# Patient Record
Sex: FEMALE | Race: WHITE | Employment: FULL TIME | URBAN - METROPOLITAN AREA
[De-identification: names, ages, dates, MRNs, and addresses within clinical notes are randomized per-mention and may not be internally consistent; named-entity substitution may affect disease eponyms.]

---

## 2017-05-01 PROCEDURE — 88175 CYTOPATH C/V AUTO FLUID REDO: CPT | Performed by: OBSTETRICS & GYNECOLOGY

## 2017-05-01 PROCEDURE — 87491 CHLMYD TRACH DNA AMP PROBE: CPT | Performed by: OBSTETRICS & GYNECOLOGY

## 2017-05-01 PROCEDURE — 87591 N.GONORRHOEAE DNA AMP PROB: CPT | Performed by: OBSTETRICS & GYNECOLOGY

## 2017-05-01 PROCEDURE — 87624 HPV HI-RISK TYP POOLED RSLT: CPT | Performed by: OBSTETRICS & GYNECOLOGY

## 2021-10-04 ENCOUNTER — OFFICE VISIT (OUTPATIENT)
Dept: OBGYN CLINIC | Facility: CLINIC | Age: 36
End: 2021-10-04
Payer: COMMERCIAL

## 2021-10-04 VITALS
BODY MASS INDEX: 32.14 KG/M2 | WEIGHT: 174.63 LBS | HEIGHT: 62 IN | SYSTOLIC BLOOD PRESSURE: 124 MMHG | DIASTOLIC BLOOD PRESSURE: 82 MMHG | HEART RATE: 77 BPM

## 2021-10-04 DIAGNOSIS — Z12.4 ENCOUNTER FOR SCREENING FOR CERVICAL CANCER: ICD-10-CM

## 2021-10-04 DIAGNOSIS — Z01.419 WELL WOMAN EXAM WITH ROUTINE GYNECOLOGICAL EXAM: Primary | ICD-10-CM

## 2021-10-04 PROCEDURE — 3079F DIAST BP 80-89 MM HG: CPT | Performed by: OBSTETRICS & GYNECOLOGY

## 2021-10-04 PROCEDURE — 3008F BODY MASS INDEX DOCD: CPT | Performed by: OBSTETRICS & GYNECOLOGY

## 2021-10-04 PROCEDURE — 3074F SYST BP LT 130 MM HG: CPT | Performed by: OBSTETRICS & GYNECOLOGY

## 2021-10-04 PROCEDURE — 99385 PREV VISIT NEW AGE 18-39: CPT | Performed by: OBSTETRICS & GYNECOLOGY

## 2021-10-04 RX ORDER — VALACYCLOVIR HYDROCHLORIDE 500 MG/1
500 TABLET, FILM COATED ORAL DAILY
COMMUNITY
Start: 2021-08-27

## 2021-10-04 NOTE — PROGRESS NOTES
[facility]  Obstetrics and Gynecology  History & Physical    CC: Patient is a new patient and here for a well woman exam     Subjective:     HPI: Belinda Chiu is a 31year old  female here for a well women exam. Patient reports doing w level: Not on file    Occupational History      Not on file    Tobacco Use      Smoking status: Former Smoker        Packs/day: 0.50        Years: 10.00        Pack years: 5        Types: Cigarettes      Smokeless tobacco: Never Used    Vaping Use      Vap Current or Ex-Partner: Not on file      Emotionally Abused: Not on file      Physically Abused: Not on file      Sexually Abused: Not on file  Housing Stability:       Unable to Pay for Housing in the Last Year: Not on file      Number of Places Lived in t masses  Ext: non-tender, no edema    Assessment:     Edel Manriquez is a 31year old  female here for a well women exam.     Patient Active Problem List:     Family history of breast cancer        Plan:     Cervical cancer screening  - discussion

## 2021-11-11 ENCOUNTER — WALK IN (OUTPATIENT)
Dept: URGENT CARE | Age: 36
End: 2021-11-11

## 2021-11-11 VITALS
HEART RATE: 74 BPM | TEMPERATURE: 98.9 F | WEIGHT: 176 LBS | OXYGEN SATURATION: 99 % | DIASTOLIC BLOOD PRESSURE: 88 MMHG | RESPIRATION RATE: 18 BRPM | SYSTOLIC BLOOD PRESSURE: 130 MMHG

## 2021-11-11 DIAGNOSIS — J02.9 SORETHROAT: ICD-10-CM

## 2021-11-11 DIAGNOSIS — Z20.822 SUSPECTED COVID-19 VIRUS INFECTION: ICD-10-CM

## 2021-11-11 DIAGNOSIS — J01.90 ACUTE NON-RECURRENT SINUSITIS, UNSPECIFIED LOCATION: Primary | ICD-10-CM

## 2021-11-11 LAB
INTERNAL PROCEDURAL CONTROLS ACCEPTABLE: YES
S PYO AG THROAT QL IA.RAPID: NEGATIVE
SARS-COV+SARS-COV-2 AG RESP QL IA.RAPID: NOT DETECTED

## 2021-11-11 PROCEDURE — 99212 OFFICE O/P EST SF 10 MIN: CPT | Performed by: EMERGENCY MEDICINE

## 2021-11-11 PROCEDURE — 87426 SARSCOV CORONAVIRUS AG IA: CPT | Performed by: EMERGENCY MEDICINE

## 2021-11-11 PROCEDURE — 87880 STREP A ASSAY W/OPTIC: CPT | Performed by: EMERGENCY MEDICINE

## 2021-11-11 RX ORDER — AMOXICILLIN AND CLAVULANATE POTASSIUM 875; 125 MG/1; MG/1
1 TABLET, FILM COATED ORAL 2 TIMES DAILY
Qty: 14 TABLET | Refills: 0 | Status: SHIPPED | OUTPATIENT
Start: 2021-11-11 | End: 2021-11-18

## 2021-11-11 ASSESSMENT — PAIN SCALES - GENERAL: PAINLEVEL: 2

## 2021-12-14 ENCOUNTER — HOSPITAL ENCOUNTER (OUTPATIENT)
Age: 36
Discharge: HOME | End: 2021-12-14
Payer: COMMERCIAL

## 2021-12-14 VITALS
OXYGEN SATURATION: 100 % | HEIGHT: 65 IN | WEIGHT: 170 LBS | DIASTOLIC BLOOD PRESSURE: 84 MMHG | SYSTOLIC BLOOD PRESSURE: 123 MMHG | BODY MASS INDEX: 28.32 KG/M2 | RESPIRATION RATE: 18 BRPM | HEART RATE: 84 BPM | TEMPERATURE: 99 F

## 2021-12-14 DIAGNOSIS — J01.90 ACUTE SINUSITIS, RECURRENCE NOT SPECIFIED, UNSPECIFIED LOCATION: ICD-10-CM

## 2021-12-14 DIAGNOSIS — Z20.822 LAB TEST NEGATIVE FOR COVID-19 VIRUS: ICD-10-CM

## 2021-12-14 DIAGNOSIS — Z20.822 EXPOSURE TO 2019 NOVEL CORONAVIRUS: ICD-10-CM

## 2021-12-14 DIAGNOSIS — J02.9 PHARYNGITIS, UNSPECIFIED ETIOLOGY: Primary | ICD-10-CM

## 2021-12-14 PROCEDURE — U0002 COVID-19 LAB TEST NON-CDC: HCPCS | Performed by: NURSE PRACTITIONER

## 2021-12-14 PROCEDURE — 99203 OFFICE O/P NEW LOW 30 MIN: CPT | Performed by: NURSE PRACTITIONER

## 2021-12-14 PROCEDURE — 87880 STREP A ASSAY W/OPTIC: CPT | Performed by: NURSE PRACTITIONER

## 2021-12-14 RX ORDER — DEXAMETHASONE SODIUM PHOSPHATE 4 MG/ML
10 INJECTION, SOLUTION INTRA-ARTICULAR; INTRALESIONAL; INTRAMUSCULAR; INTRAVENOUS; SOFT TISSUE ONCE
Status: DISCONTINUED | OUTPATIENT
Start: 2021-12-14 | End: 2021-12-14

## 2021-12-14 RX ORDER — AMOXICILLIN AND CLAVULANATE POTASSIUM 875; 125 MG/1; MG/1
1 TABLET, FILM COATED ORAL 2 TIMES DAILY
Qty: 10 TABLET | Refills: 0 | Status: SHIPPED | OUTPATIENT
Start: 2021-12-14 | End: 2021-12-19

## 2021-12-14 RX ORDER — DEXAMETHASONE 4 MG/1
10 TABLET ORAL ONCE
Status: COMPLETED | OUTPATIENT
Start: 2021-12-14 | End: 2021-12-14

## 2021-12-14 RX ORDER — FLUTICASONE PROPIONATE 50 MCG
2 SPRAY, SUSPENSION (ML) NASAL DAILY
Qty: 16 G | Refills: 0 | Status: SHIPPED | OUTPATIENT
Start: 2021-12-14 | End: 2022-01-13

## 2021-12-14 RX ORDER — LIDOCAINE HYDROCHLORIDE 20 MG/ML
5 SOLUTION OROPHARYNGEAL
Qty: 100 ML | Refills: 0 | Status: SHIPPED | OUTPATIENT
Start: 2021-12-14

## 2021-12-14 NOTE — ED PROVIDER NOTES
Patient Seen in: [facility] [address] [place] [address]      History   Patient presents with:  Sore Throat    Stated Complaint: sore throat    Subjective:   HPI  Patient is a 65-year-old female without significant medical history presents with 2-day history of 165.1 cm (5' 5\")   Wt 77.1 kg   LMP 12/13/2021   SpO2 100%   BMI 28.29 kg/m²         Physical Exam  Vitals and nursing note reviewed. Constitutional:       General: She is not in acute distress. Appearance: Normal appearance. She is well-developed. and Affect: Mood normal.         Behavior: Behavior normal.                 ED Course     Labs Reviewed   POCT RAPID STREP - Normal   RAPID SARS-COV-2 BY PCR - Normal   GRP A STREP CULT, THROAT                     MDM     Rapid strep negative.  Culture pend

## 2022-05-13 ENCOUNTER — HOSPITAL ENCOUNTER (OUTPATIENT)
Age: 37
Discharge: HOME | End: 2022-05-13
Payer: COMMERCIAL

## 2022-05-13 VITALS
HEIGHT: 65 IN | OXYGEN SATURATION: 100 % | DIASTOLIC BLOOD PRESSURE: 81 MMHG | HEART RATE: 85 BPM | WEIGHT: 170 LBS | RESPIRATION RATE: 16 BRPM | TEMPERATURE: 98 F | BODY MASS INDEX: 28.32 KG/M2 | SYSTOLIC BLOOD PRESSURE: 128 MMHG

## 2022-05-13 DIAGNOSIS — J01.00 ACUTE NON-RECURRENT MAXILLARY SINUSITIS: ICD-10-CM

## 2022-05-13 DIAGNOSIS — Z11.52 ENCOUNTER FOR SCREENING FOR COVID-19: Primary | ICD-10-CM

## 2022-05-13 DIAGNOSIS — H61.21 IMPACTED CERUMEN OF RIGHT EAR: ICD-10-CM

## 2022-05-13 LAB — SARS-COV-2 RNA RESP QL NAA+PROBE: NOT DETECTED

## 2022-05-13 PROCEDURE — 69210 REMOVE IMPACTED EAR WAX UNI: CPT | Performed by: PHYSICIAN ASSISTANT

## 2022-05-13 PROCEDURE — 99213 OFFICE O/P EST LOW 20 MIN: CPT | Performed by: PHYSICIAN ASSISTANT

## 2022-05-13 PROCEDURE — U0002 COVID-19 LAB TEST NON-CDC: HCPCS | Performed by: PHYSICIAN ASSISTANT

## 2022-05-13 RX ORDER — AMOXICILLIN AND CLAVULANATE POTASSIUM 875; 125 MG/1; MG/1
1 TABLET, FILM COATED ORAL 2 TIMES DAILY
Qty: 10 TABLET | Refills: 0 | Status: SHIPPED | OUTPATIENT
Start: 2022-05-13 | End: 2022-05-18

## 2022-05-13 NOTE — ED INITIAL ASSESSMENT (HPI)
Patient reports sinus pressure/congestion, ear pain, sore throat, feeling of swollen neck/glands x 5 days. States she has 4 infections since November.

## 2022-06-14 ENCOUNTER — PATIENT MESSAGE (OUTPATIENT)
Dept: OBGYN CLINIC | Facility: CLINIC | Age: 37
End: 2022-06-14

## 2022-06-16 ENCOUNTER — TELEPHONE (OUTPATIENT)
Dept: OBGYN CLINIC | Facility: CLINIC | Age: 37
End: 2022-06-16

## 2022-06-16 NOTE — TELEPHONE ENCOUNTER
30year old patient complaining of cramping and bloating that started the day after she finished her period. No s/s of vaginal infection. Pain was stabbing in nature on Tuesday; felt like horrible menstrual cramping. Today is a dull ache. Pain is currently 1/10 but it was 9/10 on Tuesday. Last BM was this morning; denies constipation. Some diarrhea/loose stools Wednesday morning. Denies any body aches, chills, fever. LMP: 6/8/22, gets regular menses, has not experienced something like this previously. Last OV date: 10/4/21 with Dr. Kristen Santiago for annual  Recent Test/Labs: none   Recommendations: assisted with scheduling appt.

## 2022-06-16 NOTE — TELEPHONE ENCOUNTER
Pt calling regarding her [employer] message. Says she is having severe bloating and cramping. Finished cycle on Sunday and these symptoms started Monday.

## 2022-06-16 NOTE — TELEPHONE ENCOUNTER
From: Elena Navarrete  To: Justice Cunningham MD  Sent: 6/14/2022 8:12 AM CDT  Subject: Cramping after period     Hi, my period just ended on Sunday and I am having intense cramps, almost worse than regular period cramps. Should I try to get an appointment asap?

## 2022-06-17 ENCOUNTER — OFFICE VISIT (OUTPATIENT)
Dept: OBGYN CLINIC | Facility: CLINIC | Age: 37
End: 2022-06-17
Payer: COMMERCIAL

## 2022-06-17 VITALS
WEIGHT: 180.5 LBS | SYSTOLIC BLOOD PRESSURE: 126 MMHG | BODY MASS INDEX: 33.21 KG/M2 | HEART RATE: 83 BPM | HEIGHT: 62 IN | DIASTOLIC BLOOD PRESSURE: 74 MMHG

## 2022-06-17 DIAGNOSIS — R14.0 ABDOMINAL BLOATING: ICD-10-CM

## 2022-06-17 DIAGNOSIS — R10.30 LOWER ABDOMINAL PAIN: Primary | ICD-10-CM

## 2022-06-17 PROCEDURE — 99214 OFFICE O/P EST MOD 30 MIN: CPT | Performed by: OBSTETRICS & GYNECOLOGY

## 2022-06-17 PROCEDURE — 3078F DIAST BP <80 MM HG: CPT | Performed by: OBSTETRICS & GYNECOLOGY

## 2022-06-17 PROCEDURE — 3074F SYST BP LT 130 MM HG: CPT | Performed by: OBSTETRICS & GYNECOLOGY

## 2022-06-17 PROCEDURE — 3008F BODY MASS INDEX DOCD: CPT | Performed by: OBSTETRICS & GYNECOLOGY

## 2022-06-17 RX ORDER — FLUTICASONE PROPIONATE 50 MCG
2 SPRAY, SUSPENSION (ML) NASAL DAILY
COMMUNITY
Start: 2022-06-13 | End: 2022-07-13

## 2022-06-17 RX ORDER — TRIAMCINOLONE ACETONIDE 0.25 MG/G
OINTMENT TOPICAL
COMMUNITY
Start: 2022-05-03

## 2022-06-17 RX ORDER — TACROLIMUS 1 MG/G
OINTMENT TOPICAL
COMMUNITY
Start: 2022-05-04

## 2022-06-18 LAB
CANDIDA:: NOT DETECTED
CHLAMYDIA TRACHOMATIS$RNA, TMA: NOT DETECTED
GARDNERELLA:: NOT DETECTED
NEISSERIA GONORRHOEAE$RNA, TMA: NOT DETECTED
TRICHOMONAS:: NOT DETECTED

## 2022-07-07 ENCOUNTER — ULTRASOUND ENCOUNTER (OUTPATIENT)
Dept: OBGYN CLINIC | Facility: CLINIC | Age: 37
End: 2022-07-07
Payer: COMMERCIAL

## 2022-07-07 DIAGNOSIS — R10.30 LOWER ABDOMINAL PAIN: Primary | ICD-10-CM

## 2022-07-07 PROCEDURE — 76830 TRANSVAGINAL US NON-OB: CPT | Performed by: OBSTETRICS & GYNECOLOGY

## 2022-07-07 PROCEDURE — 76856 US EXAM PELVIC COMPLETE: CPT | Performed by: OBSTETRICS & GYNECOLOGY

## 2023-11-14 ENCOUNTER — HOSPITAL ENCOUNTER (OUTPATIENT)
Age: 38
Discharge: HOME | End: 2023-11-14
Payer: COMMERCIAL

## 2023-11-14 VITALS
HEIGHT: 65 IN | WEIGHT: 190 LBS | BODY MASS INDEX: 31.65 KG/M2 | DIASTOLIC BLOOD PRESSURE: 86 MMHG | SYSTOLIC BLOOD PRESSURE: 143 MMHG | OXYGEN SATURATION: 99 % | HEART RATE: 68 BPM | TEMPERATURE: 98 F | RESPIRATION RATE: 22 BRPM

## 2023-11-14 DIAGNOSIS — H65.01 RIGHT ACUTE SEROUS OTITIS MEDIA, RECURRENCE NOT SPECIFIED: ICD-10-CM

## 2023-11-14 DIAGNOSIS — J01.10 ACUTE FRONTAL SINUSITIS, RECURRENCE NOT SPECIFIED: Primary | ICD-10-CM

## 2023-11-14 PROCEDURE — 99213 OFFICE O/P EST LOW 20 MIN: CPT | Performed by: PHYSICIAN ASSISTANT

## 2023-11-14 RX ORDER — OXYMETAZOLINE HYDROCHLORIDE 0.05 G/100ML
1 SPRAY NASAL EVERY 4 HOURS PRN
Qty: 15 ML | Refills: 0 | Status: SHIPPED | OUTPATIENT
Start: 2023-11-14 | End: 2023-12-14

## 2023-11-14 RX ORDER — BENZONATATE 100 MG/1
100 CAPSULE ORAL 3 TIMES DAILY PRN
Qty: 30 CAPSULE | Refills: 0 | Status: SHIPPED | OUTPATIENT
Start: 2023-11-14 | End: 2023-12-14

## 2023-11-14 RX ORDER — CETIRIZINE HYDROCHLORIDE 10 MG/1
10 TABLET ORAL DAILY
Qty: 30 TABLET | Refills: 0 | Status: SHIPPED | OUTPATIENT
Start: 2023-11-14 | End: 2023-12-14

## 2023-11-14 RX ORDER — PSEUDOEPHEDRINE HCL 30 MG
30 TABLET ORAL 3 TIMES DAILY
Qty: 15 TABLET | Refills: 0 | Status: SHIPPED | OUTPATIENT
Start: 2023-11-14 | End: 2023-11-19

## 2023-11-14 RX ORDER — FLUTICASONE PROPIONATE 50 MCG
2 SPRAY, SUSPENSION (ML) NASAL DAILY
Qty: 16 G | Refills: 0 | Status: SHIPPED | OUTPATIENT
Start: 2023-11-14

## 2023-11-14 NOTE — ED INITIAL ASSESSMENT (HPI)
Pt c/o right ear pain, sinus congestion, cough when lying down; symptoms started Thursday night, Pt called Tele-Doc Saturday, was prescribed Amoxicillin 800mg, rpt the antibiotic did not help to improve symptoms.   Now, Pt c/o discharge from right ear

## 2023-11-19 ENCOUNTER — HOSPITAL ENCOUNTER (OUTPATIENT)
Age: 38
Discharge: HOME | End: 2023-11-19
Payer: COMMERCIAL

## 2023-11-19 VITALS
DIASTOLIC BLOOD PRESSURE: 87 MMHG | BODY MASS INDEX: 31.65 KG/M2 | TEMPERATURE: 99 F | WEIGHT: 190 LBS | HEIGHT: 65 IN | RESPIRATION RATE: 16 BRPM | OXYGEN SATURATION: 98 % | SYSTOLIC BLOOD PRESSURE: 145 MMHG | HEART RATE: 76 BPM

## 2023-11-19 DIAGNOSIS — S65.412A LACERATION OF BLOOD VESSEL OF LEFT THUMB, INITIAL ENCOUNTER: Primary | ICD-10-CM

## 2024-02-21 ENCOUNTER — OFFICE VISIT (OUTPATIENT)
Dept: FAMILY MEDICINE CLINIC | Facility: CLINIC | Age: 39
End: 2024-02-21
Payer: COMMERCIAL

## 2024-02-21 VITALS
OXYGEN SATURATION: 98 % | HEIGHT: 65 IN | WEIGHT: 224 LBS | DIASTOLIC BLOOD PRESSURE: 84 MMHG | HEART RATE: 83 BPM | BODY MASS INDEX: 37.32 KG/M2 | SYSTOLIC BLOOD PRESSURE: 134 MMHG

## 2024-02-21 DIAGNOSIS — R61 NIGHT SWEATS: ICD-10-CM

## 2024-02-21 DIAGNOSIS — Z00.00 ENCOUNTER FOR ROUTINE HISTORY AND PHYSICAL EXAMINATION: Primary | ICD-10-CM

## 2024-02-21 DIAGNOSIS — Z87.898 HISTORY OF UNEXPLAINED FEVER: ICD-10-CM

## 2024-02-21 DIAGNOSIS — F41.9 ANXIETY: ICD-10-CM

## 2024-02-21 DIAGNOSIS — Z13.6 SCREENING FOR CARDIOVASCULAR CONDITION: ICD-10-CM

## 2024-02-21 DIAGNOSIS — N64.59 ABNORMAL BREAST EXAM: ICD-10-CM

## 2024-02-21 DIAGNOSIS — F32.A DEPRESSION, UNSPECIFIED DEPRESSION TYPE: ICD-10-CM

## 2024-02-21 PROBLEM — G43.909 MIGRAINES: Status: ACTIVE | Noted: 2024-02-21

## 2024-02-21 PROCEDURE — 99385 PREV VISIT NEW AGE 18-39: CPT | Performed by: STUDENT IN AN ORGANIZED HEALTH CARE EDUCATION/TRAINING PROGRAM

## 2024-02-21 RX ORDER — ESCITALOPRAM OXALATE 20 MG/1
TABLET ORAL
COMMUNITY
End: 2024-02-21

## 2024-02-21 RX ORDER — ESCITALOPRAM OXALATE 20 MG/1
20 TABLET ORAL DAILY
Qty: 90 TABLET | Refills: 3 | Status: SHIPPED | OUTPATIENT
Start: 2024-02-21 | End: 2025-02-15

## 2024-02-21 NOTE — PROGRESS NOTES
[facility] [place]    CC: yearly exam     HPI: Sally Clifford is 38 year old female here for a yearly physical.    1.Healthcare maintenance.  Exercise - planning on getting back into it.  Sunscreen -.  Caffeine -.  Advanced directives-    2. Family history of breast cancer/abnormal breast self-exam.  Her mother was age 55 when diagnosed with breast cancer and passed from the cancer. Her grandmother was age 63 and passed from this. BRCA tested and negative. She does have BRCA gene in her family.  She does breast self-exams. Has noticed right breast mass which she would like examined today.     3. Night sweats. She wonders if related to weight gain. Has had fluctuating weight. This has been going on for 2-3 months.     4. Depression/anxiety. Was previously on 5 mg which was gradually increased. Lexapro 20 mg. Feels symptoms controlled on this dose and she would liek to continue.    5. Has had low-grade fevers every 4-6 weeks 100-101F. Associated with shivers.  This lasts a couple days. This occurred in early February. Happened in early January.  No travel out of the [facility].     6. Eczema - takes dupixent. Sees derm through [facility].    PMH:  Patient Active Problem List   Diagnosis    Family history of breast cancer    Genital herpes, unspecified    Migraines     Past Medical History:   Diagnosis Date    Herpes        Last Physical 2/21/24     SH: reviewed     FH: reviewed     Social History     Social History Narrative    Works as  at [employer]. No tobacco. Drinks alcohol - feels she needs to cut back. Has been working on it.         ROS: full 10 point review of systems done and otherwise negative.      Healthcare Maintenance:  Health Maintenance   Topic Date Due    Annual Physical  Never done    Pneumococcal Vaccine: Birth to 64yrs (1 of 2 - PCV) Never done    COVID-19 Vaccine (3 - 2023-24 season) 09/01/2023    Influenza Vaccine (1) 10/01/2023    Pap Smear  10/04/2024    DTaP,Tdap,and Td  Vaccines (3 - Td or Tdap) 03/04/2030    Annual Depression Screening  Completed       Health Habits:  Dental Exam. UTD/recommend q6m cleans  Eye Exam. UTD/recommend checks every 1-2 years     PE:  Vital Signs    02/21/24 1553   BP: 134/84   Pulse: 83   PainSc: 0 - (None)     Wt Readings from Last 3 Encounters:   02/21/24 224 lb (101.6 kg)   11/19/23 190 lb (86.2 kg)   11/14/23 190 lb (86.2 kg)     Body mass index is 37.28 kg/m².     General: Comfortable, pleasant, NAD, appears stated age  HEENT.  NC/AT, no conjunctival injection, TMs clear, oropharynx without erythema or exudate, neck supple, no LAD or thyromegaly  CV.  RRR, no m/r/g  BREAST: no palpable mass, no overlying skin changes, no nipple dischage, no LAD  Pulm. CTAB, no W/R/R, comfortable work of breathing, speaks in full sentences  Abdomen. Soft, nt, nd  Extremities.  2+ DP pulses, no edema  Skin.  No concerning moles      No visits with results within 4 Week(s) from this visit.   Latest known visit with results is:   Office Visit on 06/17/2022   Component Date Value Ref Range Status    CHLAMYDIA TRACHOMATIS$RNA, TMA 06/17/2022 NOT DETECTED  NOT DETECTED Final    NEISSERIA GONORRHOEAE$RNA, TMA 06/17/2022 NOT DETECTED  NOT DETECTED Final    .  06/17/2022    Final    TRICHOMONAS: 06/17/2022 NOT DETECTED  NOT DETECTED Final    GARDNERELLA: 06/17/2022 NOT DETECTED  NOT DETECTED Final    CANDIDA: 06/17/2022 NOT DETECTED  NOT DETECTED Final        A/P: Sally Clifford is 38 year old yo female here for complete physical.  1. Night sweats/fevers/abnormal breast self-exam.  No obvious mass palpated on exam today, however  she does endorse Fhx breast cancer in her mother, grandmother and an uncle. Mammogram ordered.  2. Anxiety. Refill lexapro, this is working for her.    3. Healthcare Maintenance. Discussed eye exam, dentist visit q6 months, sunscreen, exercise at least 5x/week, 30 minutes daily, and limiting caffeine intake.  Routine blood work ordered.      Outpatient Encounter Medications as of 2/21/2024   Medication Sig Dispense Refill    escitalopram (LEXAPRO) 20 MG Oral Tab Take 1 tablet (20 mg total) by mouth daily. 90 tablet 3    halobetasol 0.05 % External Cream       tacrolimus 0.1 % External Ointment       DUPIXENT 300 MG/2ML Subcutaneous Solution Prefilled Syringe Inject 2 mL (300 mg total) into the skin every 14 (fourteen) days. 4 mL 5    valACYclovir 500 MG Oral Tab Take 1 tablet (500 mg total) by mouth daily.      [DISCONTINUED] escitalopram (LEXAPRO) 20 MG Oral Tab       [DISCONTINUED] fluticasone propionate 50 MCG/ACT Nasal Suspension 2 sprays by Nasal route daily. 16 g 0    [DISCONTINUED] triamcinolone 0.025 % External Ointment APPLY TO THE AFFECTED AREA OF FACE/LIPS TWICE DAILY AS NEEDED FOR FLARES       No facility-administered encounter medications on file as of 2/21/2024.           Side effects, risks and benefits of medications were explained.  The patient or responsible adult showed the ability to learn, asked appropriate questions.  There were no barriers to learning and they verbalized understanding of the treatment plan.     Medication list provided to patient and /or family member.     This note was created in part by Dragon recognition software.  Please excuse errors.

## 2024-02-27 LAB
ABSOLUTE BASOPHILS: 27 CELLS/UL (ref 0–200)
ABSOLUTE EOSINOPHILS: 61 CELLS/UL (ref 15–500)
ABSOLUTE LYMPHOCYTES: 2298 CELLS/UL (ref 850–3900)
ABSOLUTE MONOCYTES: 381 CELLS/UL (ref 200–950)
ABSOLUTE NEUTROPHILS: 4032 CELLS/UL (ref 1500–7800)
ALBUMIN/GLOBULIN RATIO: 1.8 (CALC) (ref 1–2.5)
ALBUMIN: 4.2 G/DL (ref 3.6–5.1)
ALKALINE PHOSPHATASE: 65 U/L (ref 31–125)
ALT: 50 U/L (ref 6–29)
AST: 28 U/L (ref 10–30)
BASOPHILS: 0.4 %
BILIRUBIN, TOTAL: 0.3 MG/DL (ref 0.2–1.2)
BUN: 12 MG/DL (ref 7–25)
CALCIUM: 8.9 MG/DL (ref 8.6–10.2)
CARBON DIOXIDE: 30 MMOL/L (ref 20–32)
CHLORIDE: 105 MMOL/L (ref 98–110)
CHOL/HDLC RATIO: 3 (CALC)
CHOLESTEROL, TOTAL: 180 MG/DL
CREATININE: 0.77 MG/DL (ref 0.5–0.97)
EGFR: 102 ML/MIN/1.73M2
EOSINOPHILS: 0.9 %
GLOBULIN: 2.3 G/DL (CALC) (ref 1.9–3.7)
GLUCOSE: 130 MG/DL (ref 65–99)
HDL CHOLESTEROL: 60 MG/DL
HEMATOCRIT: 38.7 % (ref 35–45)
HEMOGLOBIN: 13.4 G/DL (ref 11.7–15.5)
LDL-CHOLESTEROL: 104 MG/DL (CALC)
LYMPHOCYTES: 33.8 %
MCH: 31.5 PG (ref 27–33)
MCHC: 34.6 G/DL (ref 32–36)
MCV: 91.1 FL (ref 80–100)
MONOCYTES: 5.6 %
MPV: 9.6 FL (ref 7.5–12.5)
NEUTROPHILS: 59.3 %
NON-HDL CHOLESTEROL: 120 MG/DL (CALC)
PLATELET COUNT: 277 THOUSAND/UL (ref 140–400)
POTASSIUM: 4.5 MMOL/L (ref 3.5–5.3)
PROTEIN, TOTAL: 6.5 G/DL (ref 6.1–8.1)
RDW: 12.4 % (ref 11–15)
RED BLOOD CELL COUNT: 4.25 MILLION/UL (ref 3.8–5.1)
SODIUM: 141 MMOL/L (ref 135–146)
TRIGLYCERIDES: 72 MG/DL
TSH W/REFLEX TO FT4: 1.32 MIU/L
WHITE BLOOD CELL COUNT: 6.8 THOUSAND/UL (ref 3.8–10.8)

## 2024-03-20 ENCOUNTER — OFFICE VISIT (OUTPATIENT)
Dept: FAMILY MEDICINE CLINIC | Facility: CLINIC | Age: 39
End: 2024-03-20
Payer: COMMERCIAL

## 2024-03-20 VITALS
WEIGHT: 230 LBS | HEIGHT: 65 IN | OXYGEN SATURATION: 99 % | BODY MASS INDEX: 38.32 KG/M2 | DIASTOLIC BLOOD PRESSURE: 80 MMHG | HEART RATE: 73 BPM | SYSTOLIC BLOOD PRESSURE: 130 MMHG

## 2024-03-20 DIAGNOSIS — R73.01 ELEVATED FASTING GLUCOSE: Primary | ICD-10-CM

## 2024-03-20 DIAGNOSIS — R74.01 ELEVATED ALT MEASUREMENT: ICD-10-CM

## 2024-03-20 LAB
CARTRIDGE LOT#: NORMAL NUMERIC
HEMOGLOBIN A1C: 5.6 % (ref 4.3–5.6)

## 2024-03-20 PROCEDURE — 83036 HEMOGLOBIN GLYCOSYLATED A1C: CPT | Performed by: STUDENT IN AN ORGANIZED HEALTH CARE EDUCATION/TRAINING PROGRAM

## 2024-03-20 PROCEDURE — 99213 OFFICE O/P EST LOW 20 MIN: CPT | Performed by: STUDENT IN AN ORGANIZED HEALTH CARE EDUCATION/TRAINING PROGRAM

## 2024-03-20 NOTE — PROGRESS NOTES
[facility]    Chief Complaint   Patient presents with    Follow - Up        HPI:   Sally Clifford is 38 year old patient presenting for the followin. Elevated fasting glucose. A1c in the office today Last A1c value was 5.6% done 3/20/2024.  No polyuria, polydipsia or polyphagia.    2. Elevated AST 50. She reports she has been told she has fatty liver in the past. Additionally notes she feels she drinks more alcohol than recommended.        PMH:  1. Family history of breast cancer/abnormal breast self-exam.  Her mother was age 55 when diagnosed with breast cancer and passed from the cancer. Her grandmother was age 63 and passed from this. BRCA tested and negative.  2.  Depression/anxiety. Lexapro 20 mg.  3. Eczema - takes dupixent. Sees derm through [facility].     SH: reviewed     FH: reviewed        ROS: full 10 point review of systems done and otherwise negative.      Healthcare Maintenance:  Health Maintenance   Topic Date Due    Pneumococcal Vaccine: Birth to 64yrs (1 of 2 - PCV) Never done    Zoster Vaccines (1 of 2) Never done    COVID-19 Vaccine (3 - - season) 2023    Influenza Vaccine (1) 10/01/2023    Pap Smear  10/04/2024    Annual Physical  2025    DTaP,Tdap,and Td Vaccines (3 - Td or Tdap) 2030    Annual Depression Screening  Completed          PE:  Vital Signs    24 0848   BP: 130/80   Pulse: 73   PainSc: 0 - (None)     Wt Readings from Last 3 Encounters:   24 230 lb (104.3 kg)   24 224 lb (101.6 kg)   23 190 lb (86.2 kg)     Body mass index is 38.27 kg/m².     Physical Exam:  GEN: Well-appearing, NAD, nontoxic  CARD: Regular rate  PULM: Comfortable WOB  ABD: nd  EXT: No gross deformity  NEURO: no gross deficit  PSYCH: normal affect, thought process linear     No visits with results within 4 Week(s) from this visit.   Latest known visit with results is:   Office Visit on 2024   Component Date Value Ref Range Status    TSH  W/REFLEX TO FT4 02/26/2024 1.32  mIU/L Final    WHITE BLOOD CELL COUNT 02/26/2024 6.8  3.8 - 10.8 Thousand/uL Final    RED BLOOD CELL COUNT 02/26/2024 4.25  3.80 - 5.10 Million/uL Final    HEMOGLOBIN 02/26/2024 13.4  11.7 - 15.5 g/dL Final    HEMATOCRIT 02/26/2024 38.7  35.0 - 45.0 % Final    MCV 02/26/2024 91.1  80.0 - 100.0 fL Final    MCH 02/26/2024 31.5  27.0 - 33.0 pg Final    MCHC 02/26/2024 34.6  32.0 - 36.0 g/dL Final    RDW 02/26/2024 12.4  11.0 - 15.0 % Final    PLATELET COUNT 02/26/2024 277  140 - 400 Thousand/uL Final    MPV 02/26/2024 9.6  7.5 - 12.5 fL Final    ABSOLUTE NEUTROPHILS 02/26/2024 4,032  1,500 - 7,800 cells/uL Final    ABSOLUTE LYMPHOCYTES 02/26/2024 2,298  850 - 3,900 cells/uL Final    ABSOLUTE MONOCYTES 02/26/2024 381  200 - 950 cells/uL Final    ABSOLUTE EOSINOPHILS 02/26/2024 61  15 - 500 cells/uL Final    ABSOLUTE BASOPHILS 02/26/2024 27  0 - 200 cells/uL Final    NEUTROPHILS 02/26/2024 59.3  % Final    LYMPHOCYTES 02/26/2024 33.8  % Final    MONOCYTES 02/26/2024 5.6  % Final    EOSINOPHILS 02/26/2024 0.9  % Final    BASOPHILS 02/26/2024 0.4  % Final    GLUCOSE 02/26/2024 130 (H)  65 - 99 mg/dL Final    UREA NITROGEN (BUN) 02/26/2024 12  7 - 25 mg/dL Final    CREATININE 02/26/2024 0.77  0.50 - 0.97 mg/dL Final    EGFR 02/26/2024 102  > OR = 60 mL/min/1.73m2 Final    BUN/CREATININE RATIO 02/26/2024 SEE NOTE:  6 - 22 (calc) Final    SODIUM 02/26/2024 141  135 - 146 mmol/L Final    POTASSIUM 02/26/2024 4.5  3.5 - 5.3 mmol/L Final    CHLORIDE 02/26/2024 105  98 - 110 mmol/L Final    CARBON DIOXIDE 02/26/2024 30  20 - 32 mmol/L Final    CALCIUM 02/26/2024 8.9  8.6 - 10.2 mg/dL Final    PROTEIN, TOTAL 02/26/2024 6.5  6.1 - 8.1 g/dL Final    ALBUMIN 02/26/2024 4.2  3.6 - 5.1 g/dL Final    GLOBULIN 02/26/2024 2.3  1.9 - 3.7 g/dL (calc) Final    ALBUMIN/GLOBULIN RATIO 02/26/2024 1.8  1.0 - 2.5 (calc) Final    BILIRUBIN, TOTAL 02/26/2024 0.3  0.2 - 1.2 mg/dL Final    ALKALINE PHOSPHATASE  2024 65  31 - 125 U/L Final    AST 2024 28  10 - 30 U/L Final    ALT 2024 50 (H)  6 - 29 U/L Final    CHOLESTEROL, TOTAL 2024 180  <200 mg/dL Final    HDL CHOLESTEROL 2024 60  > OR = 50 mg/dL Final    TRIGLYCERIDES 2024 72  <150 mg/dL Final    LDL-CHOLESTEROL 2024 104 (H)  mg/dL (calc) Final    CHOL/HDLC RATIO 2024 3.0  <5.0 (calc) Final    NON-HDL CHOLESTEROL 2024 120  <130 mg/dL (calc) Final        A/P: Sally Clifford is 38 year old presenting for the followin. Elevated fasting glucose. A1c in normal range.  2. Elevated ALT - recheck in 6 weeks after stopping alcohol and if still elevated can consider further workup    Remainder of labs from physical reviewed  Recommend completing mammogram as ordered        Outpatient Encounter Medications as of 3/20/2024   Medication Sig Dispense Refill    escitalopram (LEXAPRO) 20 MG Oral Tab Take 1 tablet (20 mg total) by mouth daily. 90 tablet 3    halobetasol 0.05 % External Cream       tacrolimus 0.1 % External Ointment       DUPIXENT 300 MG/2ML Subcutaneous Solution Prefilled Syringe Inject 2 mL (300 mg total) into the skin every 14 (fourteen) days. 4 mL 5    valACYclovir 500 MG Oral Tab Take 1 tablet (500 mg total) by mouth daily.       No facility-administered encounter medications on file as of 3/20/2024.      Side effects, risks and benefits of medications were explained.  The patient or responsible adult showed the ability to learn, asked appropriate questions.  There were no barriers to learning and they verbalized understanding of the treatment plan.     Medication list provided to patient and /or family member.    Arely Alberto MD

## 2024-03-22 ENCOUNTER — TELEPHONE (OUTPATIENT)
Dept: FAMILY MEDICINE CLINIC | Facility: CLINIC | Age: 39
End: 2024-03-22

## 2024-03-22 DIAGNOSIS — N64.59 ABNORMAL BREAST EXAM: Primary | ICD-10-CM

## 2024-03-22 NOTE — TELEPHONE ENCOUNTER
Johnna from [facility] called that we need to change the mammo order for the patient. Once order change she will call patient to schedule this appt    [facility] 2D+3D DI[facility]NOST[facility]     To be the regular one not the ADD one that is put in right now     Her ext [phone]

## 2024-04-04 ENCOUNTER — HOSPITAL ENCOUNTER (OUTPATIENT)
Dept: MAMMOGRAPHY | Facility: HOSPITAL | Age: 39
Discharge: HOME | End: 2024-04-04
Attending: STUDENT IN AN ORGANIZED HEALTH CARE EDUCATION/TRAINING PROGRAM
Payer: COMMERCIAL

## 2024-04-04 DIAGNOSIS — N64.59 ABNORMAL BREAST EXAM: ICD-10-CM

## 2024-04-04 PROCEDURE — 77062 BREAST TOMOSYNTHESIS BI: CPT | Performed by: STUDENT IN AN ORGANIZED HEALTH CARE EDUCATION/TRAINING PROGRAM

## 2024-04-04 PROCEDURE — 76642 ULTRASOUND BREAST LIMITED: CPT | Performed by: STUDENT IN AN ORGANIZED HEALTH CARE EDUCATION/TRAINING PROGRAM

## 2024-04-04 PROCEDURE — 77066 DX MAMMO INCL CAD BI: CPT | Performed by: STUDENT IN AN ORGANIZED HEALTH CARE EDUCATION/TRAINING PROGRAM

## 2024-08-23 ENCOUNTER — TELEPHONE (OUTPATIENT)
Dept: FAMILY MEDICINE CLINIC | Facility: CLINIC | Age: 39
End: 2024-08-23

## 2024-08-23 ENCOUNTER — OFFICE VISIT (OUTPATIENT)
Dept: FAMILY MEDICINE CLINIC | Facility: CLINIC | Age: 39
End: 2024-08-23
Payer: COMMERCIAL

## 2024-08-23 VITALS — HEIGHT: 65 IN | BODY MASS INDEX: 41.32 KG/M2 | WEIGHT: 248 LBS

## 2024-08-23 DIAGNOSIS — R07.89 CHEST TIGHTNESS: Primary | ICD-10-CM

## 2024-08-23 DIAGNOSIS — R06.2 WHEEZING: ICD-10-CM

## 2024-08-23 PROCEDURE — 99214 OFFICE O/P EST MOD 30 MIN: CPT | Performed by: STUDENT IN AN ORGANIZED HEALTH CARE EDUCATION/TRAINING PROGRAM

## 2024-08-23 RX ORDER — UPADACITINIB 15 MG/1
1 TABLET, EXTENDED RELEASE ORAL DAILY
COMMUNITY
Start: 2024-08-19 | End: 2024-09-18

## 2024-08-23 RX ORDER — ALBUTEROL SULFATE 90 UG/1
2 AEROSOL, METERED RESPIRATORY (INHALATION) EVERY 6 HOURS PRN
Qty: 18 G | Refills: 0 | Status: SHIPPED | OUTPATIENT
Start: 2024-08-23 | End: 2024-08-26

## 2024-08-23 NOTE — PROGRESS NOTES
[facility]    Chief Complaint   Patient presents with    Breathing Problem     Has noticed she starts wheezing and has trouble getting in a good breath when she is laughing. Feels laugh has changed over the last few months. Stopped vaping nicotine at the beginning of this year. Regular use of THC via smoking or vaping since May, nightly use x 2 months.     Weight Problem     Would like to discuss injectable weight loss medication        HPI:   Sally Clifford is 39 year old patient presenting for the followin. Wheezing/chest tightness. Over past 2 months has noticed difficulty with breathing when hse is laughing.  Sometimes notices chest tightness, particularly when lying flat. No fevers. No abdominal pain. She is smoking cannibis, no tobacco. Previous smoker x13 years and vaped nicotine for about 5 years. Quit 8 months ago. Does have hx of eczema. Has just mild seasonal allergies.   Not endorsing fevers, weight loss, night sweats.    2. Elevated BMI would liek GLP-1 inhibitor. Has to check with insurance regarding coverage.     PMH:  Patient Active Problem List   Diagnosis    Family history of breast cancer    Genital herpes, unspecified    Migraines     Past Medical History:    Herpes          SH: reviewed     FH: reviewed        ROS: full 10 point review of systems done and otherwise negative.      Healthcare Maintenance:       PE:  There were no vitals filed for this visit.  Wt Readings from Last 3 Encounters:   24 248 lb (112.5 kg)   24 230 lb (104.3 kg)   24 224 lb (101.6 kg)     Body mass index is 41.27 kg/m².     Physical Exam:  GEN: Well-appearing, NAD, nontoxic  HEENT: NC/AT, PERRL, EOMI, TM without erythema or bulging bilaterally and normal ear canals, no nasal polyps, oropharynx clear without erythema or exudate, MMM  CARD: RRR, no m/r/g  PULM: CTA em  ABD: soft, nt, nd  EXT: No gross deformity  NEURO: no gross deficit  PSYCH: normal affect, thought process  linear     No visits with results within 4 Week(s) from this visit.   Latest known visit with results is:   Office Visit on 2024   Component Date Value Ref Range Status    HEMOGLOBIN A1C 2024 5.6  4.3 - 5.6 % Final    Cartridge Lot# [id number]  Numeric Final    Cartridge Expiration Date 2024  Date Final        A/P: Sally Clifford is 39 year old presenting for the followin. Wheezing/chest tightness. Normal lung exam. Encourage smoking cessation of marijuana. Can trial albuterol for when chest feels tight. Will also check CXR given hx of tobacco use. If albuterol is helpful can consider PFT's. If it is not helpful, stop using it.    2. Elevated BMI would liek GLP-1 inhibitor. Has to check with insurance regarding coverage.        Outpatient Encounter Medications as of 2024   Medication Sig Dispense Refill    RINVOQ 15 MG Oral Tablet 24 Hr Take 1 tablet by mouth daily.      escitalopram (LEXAPRO) 20 MG Oral Tab Take 1 tablet (20 mg total) by mouth daily. 90 tablet 3    halobetasol 0.05 % External Cream       tacrolimus 0.1 % External Ointment       [DISCONTINUED] DUPIXENT 300 MG/2ML Subcutaneous Solution Prefilled Syringe Inject 2 mL (300 mg total) into the skin every 14 (fourteen) days. 4 mL 5    valACYclovir 500 MG Oral Tab Take 1 tablet (500 mg total) by mouth daily. (Patient not taking: Reported on 2024)       No facility-administered encounter medications on file as of 2024.           Side effects, risks and benefits of medications were explained.  The patient or responsible adult showed the ability to learn, asked appropriate questions.  There were no barriers to learning and they verbalized understanding of the treatment plan.     Medication list provided to patient and /or family member.        Arely Alberto MD

## 2024-08-23 NOTE — TELEPHONE ENCOUNTER
Received PA for Zepbound from [employer].  They said they did not know the dosage because the doctor did not enter the script yet until patient found out if medication would be covered by insurance.  [employer] said that the will cover with a PA.     Patient is is aware of PA being needed.    KEY: [id number]

## 2024-08-23 NOTE — TELEPHONE ENCOUNTER
We received a fax from [facility] regarding coverage of Albuterol Sulfate being denied  They will not cover the above medication with the following NDC#s [id number] and [id number], they will cover all others  Also coverage is available for Levalbuterol tartrate aerosol

## 2024-08-23 NOTE — TELEPHONE ENCOUNTER
Zepbound is not an active script for this patient or that we have ever prescribed it.   No case found in [employer]Meds

## 2024-08-26 ENCOUNTER — HOSPITAL ENCOUNTER (OUTPATIENT)
Dept: GENERAL RADIOLOGY | Age: 39
Discharge: HOME | End: 2024-08-26
Attending: STUDENT IN AN ORGANIZED HEALTH CARE EDUCATION/TRAINING PROGRAM
Payer: COMMERCIAL

## 2024-08-26 DIAGNOSIS — R07.89 CHEST TIGHTNESS: ICD-10-CM

## 2024-08-26 PROCEDURE — 71046 X-RAY EXAM CHEST 2 VIEWS: CPT | Performed by: STUDENT IN AN ORGANIZED HEALTH CARE EDUCATION/TRAINING PROGRAM

## 2024-08-26 RX ORDER — ALBUTEROL SULFATE 90 UG/1
2 AEROSOL, METERED RESPIRATORY (INHALATION) EVERY 6 HOURS PRN
Qty: 18 G | Refills: 0 | Status: SHIPPED | OUTPATIENT
Start: 2024-08-26

## 2024-08-26 NOTE — TELEPHONE ENCOUNTER
It's not diagnosis codes that are the issue. It the patient's pharmacy issuing Albuterol brands that are not coved by her insurance. The patient need to contact her pharmacy and let the know the NDC#s that are not covered

## 2024-09-05 ENCOUNTER — PATIENT MESSAGE (OUTPATIENT)
Dept: FAMILY MEDICINE CLINIC | Facility: CLINIC | Age: 39
End: 2024-09-05

## 2024-09-05 DIAGNOSIS — Z76.89 ENCOUNTER FOR WEIGHT MANAGEMENT: Primary | ICD-10-CM

## 2024-09-06 NOTE — TELEPHONE ENCOUNTER
From: Sally Clifford  To: Arely Alberto  Sent: 9/5/2024 4:53 PM CDT  Subject: Auth for Zepbound    Hi      I was wondering if you had sent in the prior authorization form for Zepbound. I keep getting declined on the inhaler, which I already filled.     Thank you.

## 2024-09-09 NOTE — TELEPHONE ENCOUNTER
Dr Alberto, I will need a current order for Zepbound in order to place a prior auth for this patient

## 2024-09-11 NOTE — TELEPHONE ENCOUNTER
Can you ask patient if any personal or family history of thyroid cancer or MEN1 syndrome? Thank you

## 2024-09-12 RX ORDER — TIRZEPATIDE 2.5 MG/.5ML
2.5 INJECTION, SOLUTION SUBCUTANEOUS WEEKLY
Qty: 2 ML | Refills: 0 | Status: SHIPPED | OUTPATIENT
Start: 2024-09-12 | End: 2024-10-04

## 2024-09-30 ENCOUNTER — TELEPHONE (OUTPATIENT)
Dept: FAMILY MEDICINE CLINIC | Facility: CLINIC | Age: 39
End: 2024-09-30

## 2024-09-30 NOTE — TELEPHONE ENCOUNTER
Received fax from [employer], prior authorization required for Zepbound 2.5MG/0.5ML INJ (4PF PENS). Patient is aware of process, fax placed in Pat's in box

## 2024-10-02 PROBLEM — F32.A MILD DEPRESSION: Status: ACTIVE | Noted: 2020-03-04

## 2024-10-02 RX ORDER — UPADACITINIB 15 MG/1
TABLET, EXTENDED RELEASE ORAL
COMMUNITY
Start: 2024-09-09

## 2024-10-02 NOTE — TELEPHONE ENCOUNTER
The following 2 questions were asked on the PA    #1. Has the patient completed at least 3 months of therapy with the requested drug at a stable maintenance dose? [NOTE: If the patient is transitioning from another drug therapy for weight loss,     #2. Has the patient participated in a comprehensive weight management program that encourages behavioral modification, reduced calorie diet, and increased physical activity with continuing follow-up for at least 6 months prior to using drug therapy?

## 2024-10-02 NOTE — TELEPHONE ENCOUNTER
Denied    Note from payer: Your PA request has been denied.  Additional information will be provided in the denial communication. (Message 1140)  Payer: [facility] [employer] Case ID: [id number]    [id number] [phone]  Electronic appeal: Not supported  Appeal instructions: Your PA request has been denied.  Additional information will be provided in the denial communication

## 2025-01-28 ENCOUNTER — HOSPITAL ENCOUNTER (OUTPATIENT)
Age: 40
Discharge: HOME | End: 2025-01-28
Payer: COMMERCIAL

## 2025-01-28 VITALS
DIASTOLIC BLOOD PRESSURE: 103 MMHG | RESPIRATION RATE: 20 BRPM | HEART RATE: 106 BPM | TEMPERATURE: 99 F | OXYGEN SATURATION: 98 % | SYSTOLIC BLOOD PRESSURE: 145 MMHG

## 2025-01-28 DIAGNOSIS — B96.89 BACTERIAL SINUSITIS: Primary | ICD-10-CM

## 2025-01-28 DIAGNOSIS — R09.89 SYMPTOMS OF UPPER RESPIRATORY INFECTION (URI): ICD-10-CM

## 2025-01-28 DIAGNOSIS — J32.9 BACTERIAL SINUSITIS: Primary | ICD-10-CM

## 2025-01-28 LAB
POCT INFLUENZA A: NEGATIVE
POCT INFLUENZA B: NEGATIVE
SARS-COV-2 RNA RESP QL NAA+PROBE: NOT DETECTED

## 2025-01-28 PROCEDURE — U0002 COVID-19 LAB TEST NON-CDC: HCPCS | Performed by: NURSE PRACTITIONER

## 2025-01-28 PROCEDURE — 99214 OFFICE O/P EST MOD 30 MIN: CPT | Performed by: NURSE PRACTITIONER

## 2025-01-28 PROCEDURE — 87502 INFLUENZA DNA AMP PROBE: CPT | Performed by: NURSE PRACTITIONER

## 2025-01-28 RX ORDER — DEXAMETHASONE 4 MG/1
4 TABLET ORAL ONCE
Status: COMPLETED | OUTPATIENT
Start: 2025-01-28 | End: 2025-01-28

## 2025-01-28 RX ORDER — FLUCONAZOLE 150 MG/1
150 TABLET ORAL AS DIRECTED
Qty: 2 TABLET | Refills: 0 | Status: SHIPPED | OUTPATIENT
Start: 2025-01-28

## 2025-01-28 NOTE — ED INITIAL ASSESSMENT (HPI)
Sinus pressure, congestion, runny nose, no appetite. Fever yesterday. Achy.  Onset 5 days.  No relief w OTC sinus & flu meds.

## 2025-01-28 NOTE — DISCHARGE INSTRUCTIONS
- Given your duration of symptoms and sinus history, we will treat your sinus infection as bacterial   - Use nasal spray as directed: 1-spray into each nostril daily. Use with head tilted down and tip pointed towards outside of eye. Breath normally. You should not feel medication go down your throat.      Upper respiratory infection supportive care measures to try as applicable:  General:   - There are multiple viruses that cause similar symptoms, including: Influenza, Rhinovirus, Adenovirus, Coronaviruses (including Covid-19), RSV, parainfluenza, etc.   - Duration of symptoms typically depends on your body's ability to heal itself, which can be affected in many ways including metabolic health, diet, and genetics.    - Symptoms typically last between 7-days to 3-weeks   - Most medications do not not cure the illness, but may help to alleviate your symptoms. However, evidence is not strong.   - Antibiotics are NOT effective for viral illnesses   - Wash hands often   - Disinfect your environment, linens, electronics, etc. to limit viral spread   - Change toothbrush to limit viral spread   - Drink plenty of fluids (water, Pedialyte, etc.)   - Get plenty of rest and sleep with head elevated to help with sinus drainage and throat irritation   - Do not share utensils or drinks   - Alternate Ibuprofen (adult: 600mg) and Tylenol (adult: 650-1000mg) as needed for pain / body aches / fever   - Take a multivitamin and extra Vitamin D (~2000IU) daily, year round   - Prophylactic Vitamin C can help reduce symptoms if you become infected, but is less effective when already ill   - You may benefit from Zinc (~20mg) every day, or every other other day, for a week while sick (Zinc has been shown to kill respiratory viruses)    Sore throat:   - Salt water gargles throughout the day for sore throat   - Cepacol or Ricola lozenges as needed for sore throat    Cough / Sinus:   - Using saline spray or a couple drops into nostrils a  couple times a day can help with sinus inflammation (Use nasal spray with nose forward and applicator tip pointed towards outside of eye. Breath normally. You should not feel medication go down your throat.)   - Avoid having air blow on your face as this can worsen congestion / cough   - You may benefit from placing a garlic clove in each nostril for 10-15min which should irritate sinuses, causing you to get rid of stuck mucus. Blow your nose thoroughly afterwards   - You may benefit from spoonfuls of honey (and/or added to warm drinks) throughout the day for cough   - You may benefit from taking a decongestant (e.g. Sudafed - pseudoephedrine [behind the pharmacy counter]) (may temporarily elevate your heart rate and blood pressure)   - You may benefit from cough medication containing \"Dextromethorphan\" (e.g. Delsym)   - You may benefit from using a humidifier and/or steam showers   - You may benefit from Flonase nasal spray daily (Use with head tilted down and tip pointed towards outside of eye. Breath normally. You should not feel medication go down your throat)   - You may benefit from taking a daily allergy medication (e.g. Zyrtec, Xyzal, etc.)   - You may benefit from boiling water with lemon and cayenne pepper, then breathing in the steam (you can cover your head with a towel to help funnel the steam)

## 2025-01-28 NOTE — ED PROVIDER NOTES
History     Chief Complaint   Patient presents with    Sinus Problem     Entered by patient       Subjective:   KIRK    Sally Clifford, 39 year old female with notable medical history of sinus infections who presents with sinus pain and pressure. Patient reports s/s started ~5-days pta and are c/w her past bacterial sinus infections for which she typically needs Augmentin. She reports progressing pain and pressure to her sinuses. Patient has seen ENT in the past. Tolerating PO. Denies SOB, MEDINA, CP.       Patient Active Problem List   Diagnosis    Family history of breast cancer    Genital herpes, unspecified    Migraines    Mild depression    Papanicolaou smear of cervix with low grade squamous intraepithelial lesion (LGSIL)    Tobacco dependence      Objective:   Past Medical History:    Herpes              Past Surgical History:   Procedure Laterality Date    Other  01/2018    D&E     Tonsillectomy      [place] teeth removed  11/2004                Social History     Socioeconomic History    Marital status: Single   Tobacco Use    Smoking status: Former     Current packs/day: 1.00     Average packs/day: 1 pack/day for 13.0 years (13.0 ttl pk-yrs)     Types: Cigarettes    Smokeless tobacco: Never   Vaping Use    Vaping status: Former    Substances: Nicotine    Devices: Disposable   Substance and Sexual Activity    Alcohol use: Yes     Alcohol/week: 10.0 standard drinks of alcohol     Types: 10 Standard drinks or equivalent per week     Comment: weekly     Drug use: Yes     Types: Cannabis     Comment: smoking/vaping THC    Sexual activity: Yes     Partners: Male     Birth control/protection: Condom   Other Topics Concern    Caffeine Concern Yes     Comment: coffee in the am and soda in the pm     Exercise No    Seat Belt Yes   Social History Narrative    Works as  at [employer]. No tobacco. Drinks alcohol - feels she needs to cut back. Has been working on it.               Medications Ordered Prior to  Encounter[1]      Constitutional and vital signs reviewed.      All other systems reviewed and negative except as noted above.    I have reviewed the family history, social history, allergies, and outpatient medications.     History reviewed from EMR: Encounters, problem list, allergies, medications      Physical Exam     ED Triage Vitals [01/28/25 1505]   BP (!) 159/103   Pulse 106   Resp 20   Temp 99.3 °F (37.4 °C)   Temp src Oral   SpO2 98 %   O2 Device None (Room air)       Current:BP (!) 145/103   Pulse 106   Temp 99.3 °F (37.4 °C) (Oral)   Resp 20   LMP 01/07/2025 (Approximate)   SpO2 98%       Physical Exam  Vitals and nursing note reviewed.   Constitutional:       General: She is not in acute distress.     Appearance: Normal appearance. She is obese. She is not ill-appearing or toxic-appearing.   HENT:      Head: Normocephalic and atraumatic.      Right Ear: External ear normal.      Left Ear: External ear normal.      Nose: Congestion present.      Right Sinus: Maxillary sinus tenderness and frontal sinus tenderness present.      Left Sinus: Maxillary sinus tenderness and frontal sinus tenderness present.      Mouth/Throat:      Mouth: Mucous membranes are moist.   Eyes:      Extraocular Movements: Extraocular movements intact.      Conjunctiva/sclera: Conjunctivae normal.      Pupils: Pupils are equal, round, and reactive to light.   Cardiovascular:      Rate and Rhythm: Normal rate.      Pulses: Normal pulses.   Pulmonary:      Effort: Pulmonary effort is normal. No respiratory distress.   Musculoskeletal:         General: No swelling, tenderness or signs of injury. Normal range of motion.      Cervical back: Normal range of motion and neck supple.   Skin:     General: Skin is warm and dry.      Capillary Refill: Capillary refill takes less than 2 seconds.      Coloration: Skin is not jaundiced.   Neurological:      General: No focal deficit present.      Mental Status: She is alert and oriented to  person, place, and time. Mental status is at baseline.   Psychiatric:         Mood and Affect: Mood normal.         Behavior: Behavior normal.         Thought Content: Thought content normal.         Judgment: Judgment normal.            ED Course     Labs Reviewed   RAPID SARS-COV-2 BY PCR - Normal   POCT FLU TEST - Normal    Narrative:     This assay is a rapid molecular in vitro test utilizing nucleic acid amplification of influenza A and B viral RNA.     No orders to display       Vitals:    01/28/25 1505 01/28/25 1539   BP: (!) 159/103 (!) 145/103   Pulse: 106    Resp: 20    Temp: 99.3 °F (37.4 °C)    TempSrc: Oral    SpO2: 98%             [facility]        Sally Clifford, 39 year old female with medical history as noted above who presents with sinus pain / pressure   - Patient in NAD, BP elevated (patient aware and will f/u with primary care provider)   - Covid & Flu negative   - Given history of bacterial sinus infection, HPI, and exam, will treat sinusitis as bacterial   - Supportive care and infection control measures discussed   - Follow up with primary care provider as needed   - Work note provided       ** See ED course below for additional information on care provided / interventions / notable events throughout patient's encounter.    ** See Home Care Instructions below for care measures to trial as applicable.         ** I have independently reviewed the radiology images, clinical lab results, and ECG tracings as described above (if applicable)    ** Concerning co-morbidities possibly affecting complaint / care: Hx sinus infections     ** See disposition & plan section below for home care instructions - if applicable        Medical Decision Making  Amount and/or Complexity of Data Reviewed  Labs: ordered. Decision-making details documented in ED Course.    Risk  OTC drugs.  Prescription drug management.        Disposition and Plan     Disposition:  Discharge  1/28/2025  3:39 pm    Clinical Impression:  1.  Bacterial sinusitis    2. Symptoms of upper respiratory infection (URI)            Home care instructions:     - Given your duration of symptoms and sinus history, we will treat your sinus infection as bacterial   - Use nasal spray as directed: 1-spray into each nostril daily. Use with head tilted down and tip pointed towards outside of eye. Breath normally. You should not feel medication go down your throat.      Upper respiratory infection supportive care measures to try as applicable:  General:   - There are multiple viruses that cause similar symptoms, including: Influenza, Rhinovirus, Adenovirus, Coronaviruses (including Covid-19), RSV, parainfluenza, etc.   - Duration of symptoms typically depends on your body's ability to heal itself, which can be affected in many ways including metabolic health, diet, and genetics.    - Symptoms typically last between 7-days to 3-weeks   - Most medications do not not cure the illness, but may help to alleviate your symptoms. However, evidence is not strong.   - Antibiotics are NOT effective for viral illnesses   - Wash hands often   - Disinfect your environment, linens, electronics, etc. to limit viral spread   - Change toothbrush to limit viral spread   - Drink plenty of fluids (water, Pedialyte, etc.)   - Get plenty of rest and sleep with head elevated to help with sinus drainage and throat irritation   - Do not share utensils or drinks   - Alternate Ibuprofen (adult: 600mg) and Tylenol (adult: 650-1000mg) as needed for pain / body aches / fever   - Take a multivitamin and extra Vitamin D (~2000IU) daily, year round   - Prophylactic Vitamin C can help reduce symptoms if you become infected, but is less effective when already ill   - You may benefit from Zinc (~20mg) every day, or every other other day, for a week while sick (Zinc has been shown to kill respiratory viruses)    Sore throat:   - Salt water gargles throughout the day for sore throat   - Cepacol or Ricola  lozenges as needed for sore throat    Cough / Sinus:   - Using saline spray or a couple drops into nostrils a couple times a day can help with sinus inflammation (Use nasal spray with nose forward and applicator tip pointed towards outside of eye. Breath normally. You should not feel medication go down your throat.)   - Avoid having air blow on your face as this can worsen congestion / cough   - You may benefit from placing a garlic clove in each nostril for 10-15min which should irritate sinuses, causing you to get rid of stuck mucus. Blow your nose thoroughly afterwards   - You may benefit from spoonfuls of honey (and/or added to warm drinks) throughout the day for cough   - You may benefit from taking a decongestant (e.g. Sudafed - pseudoephedrine [behind the pharmacy counter]) (may temporarily elevate your heart rate and blood pressure)   - You may benefit from cough medication containing \"Dextromethorphan\" (e.g. Delsym)   - You may benefit from using a humidifier and/or steam showers   - You may benefit from Flonase nasal spray daily (Use with head tilted down and tip pointed towards outside of eye. Breath normally. You should not feel medication go down your throat)   - You may benefit from taking a daily allergy medication (e.g. Zyrtec, Xyzal, etc.)   - You may benefit from boiling water with lemon and cayenne pepper, then breathing in the steam (you can cover your head with a towel to help funnel the steam)      Follow-up:  Arely Alberto MD  [address]  [facility] [id number] [phone]      As needed          Medications Prescribed:  Current Discharge Medication List        START taking these medications    Details   amoxicillin clavulanate 875-125 MG Oral Tab Take 1 tablet by mouth 2 (two) times daily for 7 days.  Qty: 14 tablet, Refills: 0      fluconazole (DIFLUCAN) 150 MG Oral Tab Take 1 tablet (150 mg total) by mouth As Directed. Take 1st tablet after antibiotics. Take 2nd tablet after  addl. 72hrs if still having symptoms.  Qty: 2 tablet, Refills: 0               Carlos Mendoza, DNP, APRN, AGACNP-BC, FNP-C, CNL  Adult-Gerontology Acute Care & Family Nurse Practitioner  [facility]      The above patient (and/or guardian) was made aware that an appropriate evaluation has been performed, and that no additional testing is required at this time. In my medical judgment, there is currently no evidence of an immediate life-threatening or surgical condition, therefore discharge is indicated at this time. The patient (and/or guardian) was advised that a small risk still exists that a serious condition could develop. The patient was instructed to arrange close follow-up with their primary care provider (or the referral provider given today). The patient received written and verbal instructions regarding their condition / concerns, demonstrated understanding, and is agreement with the outpatient treatment plan.              [1]   No current facility-administered medications on file prior to encounter.     Current Outpatient Medications on File Prior to Encounter   Medication Sig Dispense Refill    RINVOQ 15 MG Oral Tablet 24 Hr       albuterol 108 (90 Base) MCG/ACT Inhalation Aero Soln Inhale 2 puffs into the lungs every 6 (six) hours as needed for Wheezing or Shortness of Breath (cough). 18 g 0    escitalopram (LEXAPRO) 20 MG Oral Tab Take 1 tablet (20 mg total) by mouth daily. 90 tablet 3    halobetasol 0.05 % External Cream       tacrolimus 0.1 % External Ointment       valACYclovir 500 MG Oral Tab Take 1 tablet (500 mg total) by mouth daily. (Patient not taking: Reported on 8/23/2024)        Suturegard Body: The suture ends were repeatedly re-tightened and re-clamped to achieve the desired tissue expansion.

## (undated) NOTE — LETTER
Date & Time: 1/28/2025, 3:39 PM  Patient: Sally Clifford  Encounter Provider(s):    Carlos Mendoza APRN       To Whom It May Concern:    Sally Clifford was seen and treated in our department on 01/28/25. Please excuse her from work until 01/31/25 when she can return if without fever (<100.4) and if feeling better.    If you have any questions or concerns, please do not hesitate to call.        __________________________________________  Carlos Mendoza DNP, SHERRY, AGACNP-BC, FNP-C, CNL  Adult-Gerontology Acute Care & Family Nurse Practitioner  [facility]

## (undated) NOTE — LETTER
Date & Time: 11/14/2023, 1:52 PM  Patient: Sree Mckay  Encounter Provider(s):    Alex Lyn PA-C       To Whom It May Concern:    Molly Butler was seen and treated in our department on 11/14/2023. She should not return to work until 11/17/2023 .     If you have any questions or concerns, please do not hesitate to call.        _____________________________  Physician/APC Signature

## (undated) NOTE — LETTER
Date & Time: 12/14/2021, 9:08 AM  Patient: Yovani Sports  Encounter Provider(s):    SHERRY Lundy       To Whom It May Concern:    Cary Leiva was seen and treated in our department on 12/14/2021.  She should not return to work until 12/16/202